# Patient Record
Sex: FEMALE | Race: BLACK OR AFRICAN AMERICAN | NOT HISPANIC OR LATINO | Employment: UNEMPLOYED | URBAN - METROPOLITAN AREA
[De-identification: names, ages, dates, MRNs, and addresses within clinical notes are randomized per-mention and may not be internally consistent; named-entity substitution may affect disease eponyms.]

---

## 2018-04-02 ENCOUNTER — HOSPITAL ENCOUNTER (EMERGENCY)
Facility: HOSPITAL | Age: 28
Discharge: HOME | End: 2018-04-02
Payer: MEDICAID

## 2018-04-02 VITALS
OXYGEN SATURATION: 100 % | TEMPERATURE: 99 F | BODY MASS INDEX: 44.98 KG/M2 | SYSTOLIC BLOOD PRESSURE: 161 MMHG | RESPIRATION RATE: 20 BRPM | WEIGHT: 270 LBS | HEIGHT: 65 IN | DIASTOLIC BLOOD PRESSURE: 93 MMHG | HEART RATE: 100 BPM

## 2018-04-02 DIAGNOSIS — S29.012A MUSCLE STRAIN OF LEFT UPPER BACK, INITIAL ENCOUNTER: Primary | ICD-10-CM

## 2018-04-02 PROCEDURE — 96372 THER/PROPH/DIAG INJ SC/IM: CPT

## 2018-04-02 PROCEDURE — 63600175 PHARM REV CODE 636 W HCPCS: Performed by: NURSE PRACTITIONER

## 2018-04-02 PROCEDURE — 25000003 PHARM REV CODE 250: Performed by: NURSE PRACTITIONER

## 2018-04-02 PROCEDURE — 99283 EMERGENCY DEPT VISIT LOW MDM: CPT | Mod: 25

## 2018-04-02 RX ORDER — KETOROLAC TROMETHAMINE 10 MG/1
10 TABLET, FILM COATED ORAL EVERY 6 HOURS
Qty: 20 TABLET | Refills: 0 | Status: SHIPPED | OUTPATIENT
Start: 2018-04-02 | End: 2019-02-21 | Stop reason: ALTCHOICE

## 2018-04-02 RX ORDER — KETOROLAC TROMETHAMINE 10 MG/1
10 TABLET, FILM COATED ORAL
Status: COMPLETED | OUTPATIENT
Start: 2018-04-02 | End: 2018-04-02

## 2018-04-02 RX ORDER — METHOCARBAMOL 500 MG/1
1000 TABLET, FILM COATED ORAL 2 TIMES DAILY PRN
Qty: 15 TABLET | Refills: 0 | Status: SHIPPED | OUTPATIENT
Start: 2018-04-02 | End: 2018-04-07

## 2018-04-02 RX ORDER — ORPHENADRINE CITRATE 30 MG/ML
60 INJECTION INTRAMUSCULAR; INTRAVENOUS
Status: COMPLETED | OUTPATIENT
Start: 2018-04-02 | End: 2018-04-02

## 2018-04-02 RX ORDER — ZOLPIDEM TARTRATE 10 MG/1
5 TABLET ORAL NIGHTLY PRN
COMMUNITY

## 2018-04-02 RX ADMIN — KETOROLAC TROMETHAMINE 10 MG: 10 TABLET, FILM COATED ORAL at 05:04

## 2018-04-02 RX ADMIN — ORPHENADRINE CITRATE 60 MG: 30 INJECTION INTRAMUSCULAR; INTRAVENOUS at 05:04

## 2018-04-02 NOTE — ED TRIAGE NOTES
back pain for 2 days, denies injury. lower back pain constant sharp pain that increases with activity

## 2018-04-02 NOTE — ED PROVIDER NOTES
Encounter Date: 4/2/2018       History     Chief Complaint   Patient presents with    Back Pain     back pain for 2 days, denies injury. lower back pain constant sharp pain that increases with activity     27-year-old female presents to emergency room complaining of mid back pain ×3 days.  Patient states she felt a car no local back pain.  Patient denies any injury to the back.  Denies any chest pain.  States she took Flexeril and seems to relieve symptoms but she only had a few pills.  Has not taken any anti-inflammatory medication.  Patient states the pain is reproducible with sitting up straight and twisting on the middle back.          Review of patient's allergies indicates:  No Known Allergies  History reviewed. No pertinent past medical history.  History reviewed. No pertinent surgical history.  History reviewed. No pertinent family history.  Social History   Substance Use Topics    Smoking status: Current Every Day Smoker     Packs/day: 1.00     Types: Cigarettes    Smokeless tobacco: Never Used    Alcohol use Yes      Comment: socially     Review of Systems   Constitutional: Negative for fever.   HENT: Negative for sore throat.    Respiratory: Negative for shortness of breath.    Cardiovascular: Negative for chest pain.   Gastrointestinal: Negative for nausea.   Genitourinary: Negative for dysuria.   Musculoskeletal: Positive for back pain.   Skin: Negative for rash.   Neurological: Negative for weakness.   Hematological: Does not bruise/bleed easily.   All other systems reviewed and are negative.      Physical Exam     Initial Vitals [04/02/18 1621]   BP Pulse Resp Temp SpO2   (!) 161/93 100 20 98.8 °F (37.1 °C) 100 %      MAP       115.67         Physical Exam    Nursing note and vitals reviewed.  Constitutional: She appears well-developed and well-nourished. No distress.   HENT:   Head: Normocephalic.   Eyes: Conjunctivae are normal.   Neck: Normal range of motion. Neck supple.   Cardiovascular:  Normal rate.   Pulmonary/Chest: Breath sounds normal. No respiratory distress.   Abdominal: Soft. She exhibits no distension and no abdominal bruit. There is no tenderness. No hernia.   Musculoskeletal:        Cervical back: Normal.        Thoracic back: She exhibits tenderness, pain and spasm. She exhibits normal range of motion and no bony tenderness.        Lumbar back: Normal.        Back:    Neurological: She is alert and oriented to person, place, and time.   Skin: Skin is warm and dry. Capillary refill takes less than 2 seconds.   Psychiatric: She has a normal mood and affect. Her behavior is normal. Judgment and thought content normal.         ED Course   Procedures  Labs Reviewed - No data to display          Medical Decision Making:   Initial Assessment:   27-year-old female presents to emergency room complaining of mid back pain ×3 days.  Patient states she felt a car no local back pain.  Patient denies any injury to the back.  Denies any chest pain.  States she took Flexeril and seems to relieve symptoms but she only had a few pills.  Has not taken any anti-inflammatory medication.  Patient states the pain is reproducible with sitting up straight and twisting on the middle back.  Patient has minimal tenderness to the left mid paraspinal back on exam.  No bony tenderness.  Does not appear to be in distress.  Lungs clear bilaterally.  No obvious deformities.  Differential Diagnosis:   Muscle strain, contusion, fracture, ligament injury  ED Management:  Based on exam I do not believe that the patient has been made for imaging at this time.  We will give Norflex and Toradol for pain.  I instructed patient to apply ice the area as needed for pain.  We discussed range of motion exercises to help with symptoms.  I instructed the patient to follow primary care in 3-5 days if symptoms continue as she may need physical therapy to help alleviate the symptoms completely.  Avoid heat application to the area.  If  shortness of breath chest pain or any worsening symptoms present return to the emergency room.                      Clinical Impression:   The encounter diagnosis was Muscle strain of left upper back, initial encounter.                           Jessica Trammell NP  04/02/18 [id number]

## 2018-04-13 ENCOUNTER — HOSPITAL ENCOUNTER (EMERGENCY)
Facility: HOSPITAL | Age: 28
Discharge: HOME | End: 2018-04-13
Attending: FAMILY MEDICINE
Payer: MEDICAID

## 2018-04-13 VITALS
SYSTOLIC BLOOD PRESSURE: 178 MMHG | WEIGHT: 270 LBS | HEART RATE: 80 BPM | DIASTOLIC BLOOD PRESSURE: 85 MMHG | BODY MASS INDEX: 44.93 KG/M2 | TEMPERATURE: 98 F | RESPIRATION RATE: 20 BRPM | OXYGEN SATURATION: 99 %

## 2018-04-13 DIAGNOSIS — J45.909 ASTHMA, UNSPECIFIED ASTHMA SEVERITY, UNSPECIFIED WHETHER COMPLICATED, UNSPECIFIED WHETHER PERSISTENT: Primary | ICD-10-CM

## 2018-04-13 PROCEDURE — 94760 N-INVAS EAR/PLS OXIMETRY 1: CPT

## 2018-04-13 PROCEDURE — 99283 EMERGENCY DEPT VISIT LOW MDM: CPT | Mod: 25

## 2018-04-13 PROCEDURE — 25000242 PHARM REV CODE 250 ALT 637 W/ HCPCS: Performed by: FAMILY MEDICINE

## 2018-04-13 PROCEDURE — 63600175 PHARM REV CODE 636 W HCPCS: Performed by: FAMILY MEDICINE

## 2018-04-13 PROCEDURE — 94640 AIRWAY INHALATION TREATMENT: CPT

## 2018-04-13 RX ORDER — IPRATROPIUM BROMIDE AND ALBUTEROL SULFATE 2.5; .5 MG/3ML; MG/3ML
3 SOLUTION RESPIRATORY (INHALATION)
Status: COMPLETED | OUTPATIENT
Start: 2018-04-13 | End: 2018-04-13

## 2018-04-13 RX ORDER — PREDNISONE 20 MG/1
60 TABLET ORAL
Status: COMPLETED | OUTPATIENT
Start: 2018-04-13 | End: 2018-04-13

## 2018-04-13 RX ADMIN — IPRATROPIUM BROMIDE AND ALBUTEROL SULFATE 3 ML: .5; 3 SOLUTION RESPIRATORY (INHALATION) at 11:04

## 2018-04-13 RX ADMIN — PREDNISONE 60 MG: 20 TABLET ORAL at 11:04

## 2018-04-13 NOTE — ED PROVIDER NOTES
Encounter Date: 4/13/2018       History     Chief Complaint   Patient presents with    Shortness of Breath     states  she is SOB last started last night, hx. asthma took neb at home last night     27-year-old female patient comes in with complaint of shortness of breath.  Patient has a history of asthma she states that her grandmother used her machine for breathing treatments and refunded otherwise patient has no other complaints including no fever chills or night sweats only a minimal cough.          Review of patient's allergies indicates:  No Known Allergies  No past medical history on file.  No past surgical history on file.  No family history on file.  Social History   Substance Use Topics    Smoking status: Current Every Day Smoker     Packs/day: 1.00     Types: Cigarettes    Smokeless tobacco: Never Used    Alcohol use Yes      Comment: socially     Review of Systems   Constitutional: Negative for fever.   HENT: Negative for sore throat.    Respiratory: Positive for shortness of breath and wheezing.    Cardiovascular: Negative for chest pain.   Gastrointestinal: Negative for nausea.   Genitourinary: Negative for dysuria.   Musculoskeletal: Negative for back pain.   Skin: Negative for rash.   Neurological: Negative for weakness.   Hematological: Does not bruise/bleed easily.   All other systems reviewed and are negative.      Physical Exam     Initial Vitals [04/13/18 1022]   BP Pulse Resp Temp SpO2   (!) 178/85 78 20 98.4 °F (36.9 °C) 100 %      MAP       116         Physical Exam    Constitutional: She appears well-developed.   HENT:   Head: Normocephalic and atraumatic.   Right Ear: External ear normal.   Left Ear: External ear normal.   Nose: Nose normal.   Mouth/Throat: Oropharynx is clear and moist.   Eyes: Conjunctivae and EOM are normal. Pupils are equal, round, and reactive to light. Right eye exhibits no discharge. Left eye exhibits no discharge.   Neck: Normal range of motion. Neck supple. No  tracheal deviation present.   Cardiovascular: Normal rate, regular rhythm and normal heart sounds.   No murmur heard.  Pulmonary/Chest: No respiratory distress. She has wheezes.   Abdominal: Soft. Bowel sounds are normal.   Neurological: She is alert and oriented to person, place, and time.   Skin: Skin is warm and dry.         ED Course   Procedures  Labs Reviewed - No data to display          Medical Decision Making:   Initial Assessment:   Patient sitting in no distress and pleasant. Patient has no other complaints other than documented.     Differential Diagnosis:   Pneumonia, sinusitis, allergies, upper respiratory illness, bronchitis                        Clinical Impression:   The encounter diagnosis was Asthma, unspecified asthma severity, unspecified whether complicated, unspecified whether persistent.                           Eliseo Lyons MD  04/13/18 [id number]

## 2023-07-24 ENCOUNTER — PATIENT MESSAGE (OUTPATIENT)
Dept: RESEARCH | Facility: HOSPITAL | Age: 33
End: 2023-07-24
Payer: MEDICAID

## 2023-07-31 ENCOUNTER — PATIENT MESSAGE (OUTPATIENT)
Dept: RESEARCH | Facility: HOSPITAL | Age: 33
End: 2023-07-31
Payer: MEDICAID